# Patient Record
Sex: FEMALE | Race: WHITE | Employment: OTHER | URBAN - METROPOLITAN AREA
[De-identification: names, ages, dates, MRNs, and addresses within clinical notes are randomized per-mention and may not be internally consistent; named-entity substitution may affect disease eponyms.]

---

## 2017-04-06 ENCOUNTER — HOSPITAL ENCOUNTER (OUTPATIENT)
Dept: PHYSICAL THERAPY | Age: 72
Discharge: HOME | End: 2017-04-06
Payer: MEDICARE

## 2017-04-06 PROCEDURE — 97162 PT EVAL MOD COMPLEX 30 MIN: CPT

## 2017-04-06 PROCEDURE — G8978 MOBILITY CURRENT STATUS: HCPCS

## 2017-04-06 PROCEDURE — 97112 NEUROMUSCULAR REEDUCATION: CPT

## 2017-04-06 PROCEDURE — G8979 MOBILITY GOAL STATUS: HCPCS

## 2017-04-06 NOTE — PROGRESS NOTES
[facility] PHYSICAL THERAPY AT [address], [address] [place] [address] Way, 216 [facility], [address] [place] [address] Ln - Phone: [phone]  Fax: [phone] / 17[address] [facility]  Patient Name: Lety Kelsey : 1945   Medical   Diagnosis: Other abnormalities of gait and mobility [R26.89] Treatment Diagnosis: Balance dysfunction   Onset Date: 2017     Referral Source: Susie Fierro MD Start of [facility]): 2017   Prior Hospitalization: See medical history Provider #: [id number]   Prior Level of Function: No reports of balance dysfunction   Comorbidities: Blood pressure issues   Medications: Verified on Patient Summary List     The Plan of Care and following information is based on the information from the initial evaluation.     ==================================================================================  Assessment / key information:   Lety Kelsey is a 73 y.o.  yo female with Dx of Other abnormalities of gait and mobility [R26.89]. She reports onset in 2017 following fracture of her right tibia. Following injury, she was in a boot for 3 weeks. She denies falls, but reports unsteadiness. She denies dizziness. Objectively, the patient demonstrates LE strength WFLs, but demonstrates weakness of hip extension, DF and PF. Sensation in the LEs in intact. Visual inspection of feet reveals L foot hammertoe formation of digit 2 over the great toe. The right great toe has bunion formation and decreased extension mobility. Ms. Regina Live has decreased static/dynamic functional balance, diminished ambulatory balance (FGA = 18/30 points). Pt will benefit from PT/Vestibular rehab to address these deficits, improve functional independence and imbalance with normal daily activities.  Thank you for this referral.   ===========================================================================================  Eval Complexity: History MEDIUM  Complexity : 1-2 comorbidities / personal factors will impact the outcome/ POC ;  Examination  MEDIUM Complexity : 3 Standardized tests and measures addressing body structure, function, activity limitation and / or participation in recreation ; Presentation MEDIUM Complexity : Evolving with changing characteristics ; Decision Making MEDIUM Complexity : FOTO score of 26-74; Overall Complexity MEDIUM  Problem List: impaired gait/ balance, decrease ADL/ functional abilitiies, decrease activity tolerance and decrease transfer abilities   Treatment Plan may include any combination of the following: Therapeutic exercise, Therapeutic activities, Neuromuscular re-education, Gait/balance training and Patient education  Patient / Family readiness to learn indicated by: asking questions, trying to perform skills and interest  Persons(s) to be included in education: patient (P)  Barriers to Learning/Limitations: no  Measures taken: None needed   Patient Goal (s): Good gait, balance and posture   Rehabilitation Potential: excellent  Reported health status: good   Short Term Goals: To be accomplished in 4-6  treatments:  1. Patient will report at least 25% reduction of symptoms with ADLs. 2. Patient will be independent and complaint with HEP TID to reduce imbalance with ADLs.  Long Term Goals: To be accomplished in 10-12 treatments:  1. Patient will report at least 50% reduction of symptoms with ADLs. 2. Patient will be independent with self progression of HEP and demonstrate willingness to continue HEP after D/C to maximize/maintain gains in functional mobility. 3. FGA will improve to greater then or equal to 22/30 points to demonstrate a significant improvement in ambulatory balance. Frequency / Duration:   Patient to be seen  2  times per week for 10  treatments:  Patient / Caregiver education and instruction: self care, activity modification and exercises  G-Codes (GP): Mobility [id number] Current  CJ= 20-39%   Goal  CI= 1-19%. The severity rating is based on the FOTO Score  Therapist Signature: Suresh Palacios MILO Date: 2/5/4374   Certification Period: 4/6/17 to 7/3/17 Time: 11:29 AM   ===========================================================================================  I certify that the above Physical Therapy Services are being furnished while the patient is under my care. I agree with the treatment plan and certify that this therapy is necessary. Physician Signature:        Date:       Time:     Please sign and return to [facility] or you may fax the signed copy to [phone]. Thank you.

## 2017-04-06 NOTE — PROGRESS NOTES
PHYSICAL THERAPY - DAILY TREATMENT NOTE    Patient Name: Melissa Ribeiro        Date: 2017  : 1945    Patient  Verified: yes  Visit #:   1   of   10  Insurance: Payor: Mono Branham / Plan: [place] MEDICARE PART A & B / Product Type: Medicare /      In time: 1010 Out time: 1100   Total Treatment Time: 50     Medicare Time Tracking (below)   Total Timed Codes (min):  15 1:1 Treatment Time:  50     TREATMENT AREA =  Other abnormalities of gait and mobility [R26.89]    SUBJECTIVE  Pain Level (on 0 to 10 scale):  0  / 10   Medication Changes/New allergies or changes in medical history, any new surgeries or procedures?     NO    If yes, update Summary List   Subjective Functional Status/Changes:  []  No changes reported     See EVAL/ POC         OBJECTIVE  Modalities Rationale: to decrease pain, edema, neural compromise in order to perform ADLs/ rest with improved ease        min [] Estim, type/location:                                      []  att     []  unatt     []  w/US     []  w/ice    []  w/heat    min []  Mechanical Traction: type/lbs                   []  pro   []  sup   []  int   []  cont    []  before manual    []  after manual    min []  Ultrasound, settings/location:      min []  Iontophoresis w/ dexamethasone, location:                                               []  take home patch       []  in clinic    min []  Ice     []  Heat    location/position:     min []  Vasopneumatic Device, press/temp:     min []  Other:    [] Skin assessment post-treatment (if applicable):    []  intact    []  redness- no adverse reaction     []redness  adverse reaction:         min Therapeutic Exercise:  []  See flow sheet   Rationale:      increase ROM and increase strength to improve the patients ability to perform ADLs      min Manual Therapy:    Rationale:      decrease pain, increase ROM, increase tissue extensibility and decrease trigger points to improve patient's ability to perform ADLs    15 min Neuromuscular Re-ed: See flow sheet   Rationale:    improve coordination, improve balance, increase proprioception and improved posture, improve motor control to improve the patients ability to perform ADLs     min Gait Training:    Rationale:       min Patient Education:  YES  Reviewed HEP   []  Progressed/Changed HEP based on:         Other Objective/Functional Measures:    See EVAL/ POC     Post Treatment Pain Level (on 0 to 10) scale:   0  / 10     ASSESSMENT      [x]  See POC     PLAN  [x]  Upgrade activities as tolerated  Continue plan of care: yes   []  Discharge due to :    []  Other:      Therapist: Celine Kenny PT    Date: 4/6/2017 Time: 11:26 AM     Future Appointments  Date Time Provider Piyush Blanco   4/13/2017 12:00 PM Celine Kenny PT REHAB CENTER AT [facility]   4/19/2017 12:00 PM Angela Sherman, PT REHAB CENTER AT [facility]   4/20/2017 12:00 PM Celine Kenny PT REHAB CENTER AT [facility]   4/24/2017 10:30 AM Angela Sherman, PT REHAB CENTER AT [facility]   4/27/2017 11:30 AM Celine Kenny PT REHAB CENTER AT [facility]   5/1/2017 11:30 AM Celine Kenny PT REHAB CENTER AT [facility]   5/4/2017 11:00 AM Celine Kenny PT REHAB CENTER AT [facility]

## 2017-04-13 ENCOUNTER — HOSPITAL ENCOUNTER (OUTPATIENT)
Dept: PHYSICAL THERAPY | Age: 72
Discharge: HOME | End: 2017-04-13
Payer: MEDICARE

## 2017-04-13 PROCEDURE — 97110 THERAPEUTIC EXERCISES: CPT

## 2017-04-13 PROCEDURE — 97112 NEUROMUSCULAR REEDUCATION: CPT

## 2017-04-13 NOTE — PROGRESS NOTES
PHYSICAL THERAPY - DAILY TREATMENT NOTE    Patient Name: Jolly Bender        Date: 2017  : 1945   YES Patient  Verified  Visit #:   2   of   10  Insurance: Payor: Jonny Hirsch / Plan: [place] MEDICARE PART A & B / Product Type: Medicare /      In time: 3695 Out time: 1573   Total Treatment Time: 35     Medicare Time Tracking (below)   Total Timed Codes (min):  35 1:1 Treatment Time:  35     TREATMENT AREA = Other abnormalities of gait and mobility [R26.89]    SUBJECTIVE  Pain Level (on 0 to 10 scale):  0  / 10   Medication Changes/New allergies or changes in medical history, any new surgeries or procedures? NO    If yes, update Summary List   Subjective Functional Status/Changes:  []  No changes reported     Working on ex's        OBJECTIVE    10 min Therapeutic Exercise:  [x]  See flow sheet   Rationale:      increase strength to improve the patients ability to amb/ transfer with improved safety         25 min Neuromuscular Re-ed: [x]  See flow sheet   Rationale:    improve coordination, improve balance and increase proprioception to improve the patients ability to ambulate/ transfer with improved safety        throughout Rx min Patient Education:  YES  Reviewed HEP   []  Progressed/Changed HEP based on: Other Objective/Functional Measures:    Initiated dynamic gait activity. Pt requires close supervision    Provided pt with written instruct for HEP     Post Treatment Pain Level (on 0 to 10) scale:   0  / 10     ASSESSMENT  Assessment/Changes in Function:     Functional improvement:  Progressed HEP   Functional limitation:  Balance difficulty with amb      []  See Progress Note/Recertification   Patient will continue to benefit from skilled PT services to modify and progress therapeutic interventions, address functional mobility deficits, address strength deficits and address imbalance/dizziness to attain remaining goals. Progress toward goals / Updated goals:    Progressed HEP.  Provided written instruct     PLAN  []  Upgrade activities as tolerated YES Continue plan of care   []  Discharge due to :    []  Other:      Therapist: Suresh Palacios PT    Date: 4/13/2017 Time: 1:08 PM     Future Appointments  Date Time Provider Piyush Blanco   4/19/2017 12:00 PM Marlen Kumar PT REHAB CENTER AT [facility]   4/20/2017 12:00 PM Suresh Palacios PT REHAB CENTER AT [facility]   4/24/2017 10:30 AM Marlen Kumar PT REHAB CENTER AT [facility]   4/27/2017 11:30 AM Suresh Palacios PT REHAB CENTER AT [facility]   5/1/2017 11:30 AM Suresh Palacios PT REHAB CENTER AT [facility]

## 2017-04-20 ENCOUNTER — HOSPITAL ENCOUNTER (OUTPATIENT)
Dept: PHYSICAL THERAPY | Age: 72
Discharge: HOME | End: 2017-04-20
Payer: MEDICARE

## 2017-04-20 PROCEDURE — 97110 THERAPEUTIC EXERCISES: CPT

## 2017-04-20 PROCEDURE — 97112 NEUROMUSCULAR REEDUCATION: CPT

## 2017-04-20 NOTE — PROGRESS NOTES
PHYSICAL THERAPY - DAILY TREATMENT NOTE    Patient Name: Melissa Ribeiro        Date: 2017  : 1945   YES Patient  Verified  Visit #:   3   of   10  Insurance: Payor: Mono Branham / Plan: [place] MEDICARE PART A & B / Product Type: Medicare /      In time: 6285 Out time: 7618   Total Treatment Time: 40     Medicare Time Tracking (below)   Total Timed Codes (min):  40 1:1 Treatment Time:  40     TREATMENT AREA = Other abnormalities of gait and mobility [R26.89]    SUBJECTIVE  Pain Level (on 0 to 10 scale):  0  / 10   Medication Changes/New allergies or changes in medical history, any new surgeries or procedures? NO    If yes, update Summary List   Subjective Functional Status/Changes:  []  No changes reported     Practicing ex at home. No falls or near falls        OBJECTIVE    10 min Therapeutic Exercise:  [x]  See flow sheet   Rationale:      increase strength to improve the patients ability to perform ADLs/ transfer/ amb with inc ease         30 min Neuromuscular Re-ed: [x]  See flow sheet   Rationale:    increase strength, improve coordination, improve balance and increase proprioception to improve the patients ability to amb safely       min Gait Training:    Rationale:        throughout Rx min Patient Education:  YES  Reviewed HEP   []  Progressed/Changed HEP based on: Other Objective/Functional Measures:    Able to progress footing with balance activity- EO/ compliant surface     Post Treatment Pain Level (on 0 to 10) scale:   0  / 10     ASSESSMENT  Assessment/Changes in Function:     Functional improvement:  No falls or near falls   Functional limitation:  Balance dysfunction      []  See Progress Note/Recertification   Patient will continue to benefit from skilled PT services to modify and progress therapeutic interventions, address functional mobility deficits, address strength deficits and address imbalance/dizziness to attain remaining goals.    Progress toward goals / Updated goals:    Progressed HEP     PLAN  [x]  Upgrade activities as tolerated YES Continue plan of care   []  Discharge due to :    []  Other:      Therapist: Valeriy Sorto PT    Date: 4/20/2017 Time: 10:54 AM     Future Appointments  Date Time Provider Piyush Blanco   4/20/2017 11:00 AM Valeriy Sorto PT REHAB CENTER AT [facility]   4/24/2017 10:30 AM Angela Sherman PT REHAB CENTER AT [facility]   4/27/2017 11:30 AM Valeriy Sorto, PT REHAB CENTER AT [facility]   5/1/2017 11:30 AM Valeriy Sorto, PT REHAB CENTER AT [facility]

## 2017-04-24 ENCOUNTER — APPOINTMENT (OUTPATIENT)
Dept: PHYSICAL THERAPY | Age: 72
End: 2017-04-24
Payer: MEDICARE

## 2017-04-27 ENCOUNTER — HOSPITAL ENCOUNTER (OUTPATIENT)
Dept: PHYSICAL THERAPY | Age: 72
Discharge: HOME | End: 2017-04-27
Payer: MEDICARE

## 2017-04-27 PROCEDURE — 97110 THERAPEUTIC EXERCISES: CPT

## 2017-04-27 PROCEDURE — 97112 NEUROMUSCULAR REEDUCATION: CPT

## 2017-04-27 NOTE — PROGRESS NOTES
PHYSICAL THERAPY - DAILY TREATMENT NOTE    Patient Name: Jolly Bender        Date: 2017  : 1945   YES Patient  Verified  Visit #:      10  Insurance: Payor: Jonny Hirsch / Plan: [place] MEDICARE PART A & B / Product Type: Medicare /      In time: 1120 Out time: 1200   Total Treatment Time: 40     Medicare Time Tracking (below)   Total Timed Codes (min):  40 1:1 Treatment Time:  25     TREATMENT AREA = Other abnormalities of gait and mobility [R26.89]    SUBJECTIVE  Pain Level (on 0 to 10 scale):  0  / 10   Medication Changes/New allergies or changes in medical history, any new surgeries or procedures? NO    If yes, update Summary List   Subjective Functional Status/Changes:  []  No changes reported     Doing well. No falls or near falls. Doing ex at home.  notices my balance is better   OBJECTIVE    10 min Therapeutic Exercise:  [x]  See flow sheet   Rationale:      increase strength to improve the patients ability to perform ADLs/ amb/ transfer safely         30, 15 1:1 min Neuromuscular Re-ed: [x]  See flow sheet   Rationale:    increase strength, improve coordination, improve balance and increase proprioception to improve the patients ability to perform ADLs/ amb/ transfer safely          throughout Rx min Patient Education:  YES  Reviewed HEP   []  Progressed/Changed HEP based on: Other Objective/Functional Measures:    Able to progress reps of strength ex     Post Treatment Pain Level (on 0 to 10) scale:   0  / 10     ASSESSMENT  Assessment/Changes in Function:     Functional improvement:  Inc ease with climbing stairs.      Functional limitation:  Balance deficits with amb      []  See Progress Note/Recertification   Patient will continue to benefit from skilled PT services to modify and progress therapeutic interventions, address functional mobility deficits, address strength deficits, analyze and cue movement patterns and address imbalance/dizziness to attain remaining goals.   Progress toward goals / Updated goals:    Steady progress.   Reports compliance with HEP     PLAN  []  Upgrade activities as tolerated YES Continue plan of care   []  Discharge due to :    []  Other:      Therapist: Rivera Self PT    Date: 4/27/2017 Time: 11:37 AM     Future Appointments  Date Time Provider Piyush Blanco   5/1/2017 11:30 AM Rivera Self PT REHAB CENTER AT [facility]

## 2017-05-01 ENCOUNTER — HOSPITAL ENCOUNTER (OUTPATIENT)
Dept: PHYSICAL THERAPY | Age: 72
Discharge: HOME | End: 2017-05-01
Payer: MEDICARE

## 2017-05-01 PROCEDURE — 97112 NEUROMUSCULAR REEDUCATION: CPT

## 2017-05-01 PROCEDURE — 97110 THERAPEUTIC EXERCISES: CPT

## 2017-05-01 PROCEDURE — G8980 MOBILITY D/C STATUS: HCPCS

## 2017-05-01 PROCEDURE — G8979 MOBILITY GOAL STATUS: HCPCS

## 2017-05-01 NOTE — PROGRESS NOTES
PHYSICAL THERAPY - DAILY TREATMENT NOTE    Patient Name: Olivia Estrada        Date: 2017  : 1945   YES Patient  Verified  Visit #:   5   of   10  Insurance: Payor: Shaina Aceves / Plan: [place] MEDICARE PART A & B / Product Type: Medicare /      In time: 1120 Out time: 1200   Total Treatment Time: 40     Medicare Time Tracking (below)   Total Timed Codes (min):  30 1:1 Treatment Time:  30     TREATMENT AREA = Other abnormalities of gait and mobility [R26.89]    SUBJECTIVE  Pain Level (on 0 to 10 scale):  0  / 10   Medication Changes/New allergies or changes in medical history, any new surgeries or procedures? NO    If yes, update Summary List   Subjective Functional Status/Changes:  []  No changes reported     Overall- 90% better. I feel a lot better. OBJECTIVE    10 min Therapeutic Exercise:  [x]  See flow sheet   Rationale:      increase strength to improve the patients ability to amb safely/ transition with ease         20 min Neuromuscular Re-ed: [x]  See flow sheet/ FGA completed   Rationale:    improve coordination and improve balance to improve the patients ability to amb safely         throughout Rx min Patient Education:  YES  Reviewed HEP   []  Progressed/Changed HEP based on: Other Objective/Functional Measures:    FOTO= 67     Post Treatment Pain Level (on 0 to 10) scale:   0  / 10     ASSESSMENT  Assessment/Changes in Function:     Functional improvement:  amb with improved safety              Progress toward goals / Updated goals:    Meeting progressing towards goals    G codes: Mobility:   Goal  CI= 1-19%  D/C  CJ= 20-39%. The severity rating is based on the FOTO Score           PLAN  [x]  Upgrade activities as tolerated YES Continue plan of care   []  Discharge due to :    []  Other:      Therapist: Elieser Avila PT    Date: 2017 Time: 11:17 AM     Future Appointments  Date Time Provider Piyush Blanco   2017 11:30 AM Elieser Avila PT REHAB CENTER AT [facility]

## 2017-05-04 ENCOUNTER — APPOINTMENT (OUTPATIENT)
Dept: PHYSICAL THERAPY | Age: 72
End: 2017-05-04
Payer: MEDICARE

## 2017-07-31 NOTE — PROGRESS NOTES
[address] PHYSICAL THERAPY AT [address], [address] [place] [address], [address], [address] [place] [address]  Phone: [phone]  Fax: [phone] FOR PHYSICAL THERAPY          Patient Name: Ben Chan : 1945   Treatment/Medical Diagnosis: Other abnormalities of gait and mobility [R26.89]   Onset Date: 2017    Referral Source: Jacklyn Doll MD Start of [facility]): 17   Prior Hospitalization: See Medical History Provider #: [id number]   Prior Level of Function: No reports of balance dysfunction   Comorbidities: blood pressure issues   Medications: Verified on Patient Summary List   Visits from [facility]: 5 Missed Visits: 1     Goal/Measure of Progress Goal Met? 1. Patient will report at least 50% reduction of symptoms with ADLs. Status at last Eval: na Current Status: 90% yes   2. Patient will be independent with self progression of HEP and demonstrate willingness to continue HEP after D/C to maximize/maintain gains in functional mobility. Status at last Eval: na Current Status: independent yes   3. FGA will improve to greater then or equal to 22/30 points to demonstrate a significant improvement in ambulatory balance. Status at last Eval:  Current Status: 2330 yes       Key Functional Changes/Progress: Patient demonstrates/ reports improved balance with standing and ambulating. She is independent with HEP. G-Codes (GP): Mobility:  [id number] Goal  CI= 1-19%  D/C  CJ= 20-39%. The severity rating is based on the FOTO Score  Assessments/Recommendations: Discontinue therapy. Progressing towards or have reached established goals. If you have any questions/comments please contact us directly at [phone]. Thank you for allowing us to assist in the care of your patient.     Therapist Signature: Darrin Morrow, MILO Date: 17   Reporting Period: 17 to 17 Time: 9:30 AM

## 2018-05-02 ENCOUNTER — HOSPITAL ENCOUNTER (OUTPATIENT)
Dept: PHYSICAL THERAPY | Age: 73
Discharge: HOME | End: 2018-05-02
Payer: MEDICARE

## 2018-05-02 PROCEDURE — 97112 NEUROMUSCULAR REEDUCATION: CPT

## 2018-05-02 PROCEDURE — G8979 MOBILITY GOAL STATUS: HCPCS

## 2018-05-02 PROCEDURE — G8978 MOBILITY CURRENT STATUS: HCPCS

## 2018-05-02 PROCEDURE — 97162 PT EVAL MOD COMPLEX 30 MIN: CPT

## 2018-05-02 NOTE — PROGRESS NOTES
[address]   PHYSICAL THERAPY AT [address] [facility] [address] [facility], [address] [place] [address], 216 [facility], [address] [place] [address] - Phone: [phone]  Fax: [phone] / [facility]  Patient Name: Kimber Macias : 1945   Medical   Diagnosis: Other abnormalities of gait and mobility [R26.89] Treatment Diagnosis: Balance dysfunction/ gait dysfunction   Onset Date: ongoing     Referral Source: Heather Naranjo MD Start of Care [facility]): 2018   Prior Hospitalization: See medical history Provider #: [id number]   Prior Level of Function: Ongoing balance and gaitdysfunction   Comorbidities: HTN   Medications: Verified on Patient Summary List     The Plan of Care and following information is based on the information from the initial evaluation.     ==================================================================================  Assessment / key information:   Kimber Macias is a 81 y.o.  yo female with Dx of Other abnormalities of gait and mobility [R26.89]. She reports that her family has noticed progressive worsening of her posture which results in balance and gait issues. Objectively, the patient stands with forward trunk posture with L lateral tilt. She demonstrates decreased static/dynamic functional balance, diminished ambulatory balance (FGA = 18/30 points). LE strength and ROM are WNLs. She reports normal sensation to light touch. Pt will benefit from PT/Vestibular rehab to address these deficits, improve functional independence and imbalance with normal daily activities.  Thank you for this referral.   ===========================================================================================  Eval Complexity: History MEDIUM  Complexity : 1-2 comorbidities / personal factors will impact the outcome/ POC ;  Examination  MEDIUM Complexity : 3 Standardized tests and measures addressing body structure, function, activity limitation and / or participation in recreation ; Presentation MEDIUM Complexity : Evolving with changing characteristics ; Decision Making Other outcome measures Functional Gait Assessment  MEDIUM; Overall Complexity MEDIUM  Problem List: impaired gait/ balance, decrease ADL/ functional abilitiies, decrease activity tolerance and decrease transfer abilities   Treatment Plan may include any combination of the following: Therapeutic exercise, Therapeutic activities, Neuromuscular re-education, Gait/balance training and Patient education  Patient / Family readiness to learn indicated by: asking questions, trying to perform skills and interest  Persons(s) to be included in education: patient (P)  Barriers to Learning/Limitations: no  Measures taken: None needed   Patient Goal (s): Restore posture   Rehabilitation Potential: excellent  Self reported health status: fair- good   Short Term Goals: To be accomplished in 4-5  treatments:  1. Patient will report at least 25% reduction of symptoms with ADLs. 2. Patient will be independent and complaint with HEP TID to reduce imbalance and dizziness with ADLs. 3. FGA will improve to greater than or equal to 20points to demonstrate reduction of imbalance with ADLs.  Long Term Goals: To be accomplished in 10 treatments:  1. Patient will report at least 50% reduction of symptoms with ADLs. 2. Patient will be independent with self progression of HEP and demonstrate willingness to continue HEP after D/C to maximize/maintain gains in functional mobility. 3. FGA will improve to greater then or equal to 22/30 points to demonstrate a significant improvement in ambulatory balance. Frequency / Duration:   Patient to be seen  2  times per week for 10  treatments:  Patient / Caregiver education and instruction: self care, activity modification and exercises  G-Codes (GP): Mobility [id number] Current  CK= 40-59%   Goal  CJ= 20-39%.   The severity rating is based on the Other FGA  Therapist Signature: Mj Godfrey PT Date: 8/5/0059   Certification Period: 5/2/18- 7/31/18 Time: 3:48 PM   ===========================================================================================  I certify that the above Physical Therapy Services are being furnished while the patient is under my care. I agree with the treatment plan and certify that this therapy is necessary. Physician Signature:        Date:       Time:     Please sign and return to [facility] or you may fax the signed copy to [phone]. Thank you.

## 2018-05-02 NOTE — PROGRESS NOTES
PHYSICAL THERAPY - DAILY TREATMENT NOTE    Patient Name: Loraine Alejo        Date: 2018  : 1945    Patient  Verified: yes  Visit #:   1   of   10  Insurance: Payor: Willow Walker / Plan: [place] MEDICARE PART A & B / Product Type: Medicare /      In time: 305 Out time: 345   Total Treatment Time: 40     Medicare Time Tracking (below)   Total Timed Codes (min):  15 1:1 Treatment Time:  40     TREATMENT AREA =  Other abnormalities of gait and mobility [R26.89]    SUBJECTIVE  Pain Level (on 0 to 10 scale):  0  / 10   Medication Changes/New allergies or changes in medical history, any new surgeries or procedures?     NO    If yes, update Summary List   Subjective Functional Status/Changes:  []  No changes reported     See EVAL/ POC         OBJECTIVE  Modalities Rationale: to decrease pain, edema, neural compromise in order to perform ADLs/ rest with improved ease        min [] Estim, type/location:                                      []  att     []  unatt     []  w/US     []  w/ice    []  w/heat    min []  Mechanical Traction: type/lbs                   []  pro   []  sup   []  int   []  cont    []  before manual    []  after manual    min []  Ultrasound, settings/location:      min []  Iontophoresis w/ dexamethasone, location:                                               []  take home patch       []  in clinic    min []  Ice     []  Heat    location/position:     min []  Vasopneumatic Device, press/temp:     min []  Other:    [] Skin assessment post-treatment (if applicable):    []  intact    []  redness- no adverse reaction     []redness  adverse reaction:         min Therapeutic Exercise:  []  See flow sheet   Rationale:      increase ROM and increase strength to improve the patients ability to perform ADLs      min Manual Therapy:    Rationale:      decrease pain, increase ROM, increase tissue extensibility and decrease trigger points to improve patient's ability to perform ADLs    15 min Neuromuscular Re-ed: See flow sheet   Rationale:    improve coordination, improve balance, increase proprioception and improved posture, improve motor control to improve the patients ability to perform ADLs     min Gait Training:    Rationale:       min Patient Education:  YES  Reviewed HEP   []  Progressed/Changed HEP based on: Other Objective/Functional Measures:    See EVAL/ POC     Post Treatment Pain Level (on 0 to 10) scale:   0  / 10     ASSESSMENT      [x]  See POC     PLAN  [x]  Upgrade activities as tolerated  Continue plan of care: yes   []  Discharge due to :    []  Other:      Therapist: Uri Watson PT    Date: 5/2/2018 Time: 3:46 PM     No future appointments.

## 2018-05-09 ENCOUNTER — HOSPITAL ENCOUNTER (OUTPATIENT)
Dept: PHYSICAL THERAPY | Age: 73
Discharge: HOME | End: 2018-05-09
Payer: MEDICARE

## 2018-05-09 PROCEDURE — 97112 NEUROMUSCULAR REEDUCATION: CPT

## 2018-05-09 PROCEDURE — 97110 THERAPEUTIC EXERCISES: CPT

## 2018-05-09 NOTE — PROGRESS NOTES
PHYSICAL THERAPY - DAILY TREATMENT NOTE    Patient Name: Nav Sale        Date: 2018  : 1945   YES Patient  Verified  Visit #:   2   of   10  Insurance: Payor: Bridget Hanks / Plan: [place] MEDICARE PART A & B / Product Type: Medicare /      In time: 4850 Out time: 1055   Total Treatment Time: 30     Medicare Time Tracking (below)   Total Timed Codes (min):  30 1:1 Treatment Time:  30     TREATMENT AREA = Other abnormalities of gait and mobility [R26.89]    SUBJECTIVE  Pain Level (on 0 to 10 scale):  0  / 10   Medication Changes/New allergies or changes in medical history, any new surgeries or procedures? NO    If yes, update Summary List   Subjective Functional Status/Changes:  []  No changes reported     I can feel that I did those exercises        OBJECTIVE    30 min Therapeutic Exercise:  [x]  See flow sheet   Rationale:      increase ROM, increase strength and improve posture to improve the patients ability to perform ADLs         30 min Neuromuscular Re-ed: [x]  See flow sheet   Rationale:    improve coordination and improve balance to improve the patients ability to amb/ stand safely        throughout Rx min Patient Education:  YES  Reviewed HEP   []  Progressed/Changed HEP based on: Other Objective/Functional Measures: Added strength ex to mid back to promote upright posture. Progressed balance ex     Post Treatment Pain Level (on 0 to 10) scale:   0  / 10     ASSESSMENT  Assessment/Changes in Function:     Functional improvement:  Progressed HEP        []  See Progress Note/Recertification   Patient will continue to benefit from skilled PT services to modify and progress therapeutic interventions, address functional mobility deficits, address ROM deficits, address strength deficits and address imbalance/dizziness to attain remaining goals.    Progress toward goals / Updated goals:    Progressed HEP     PLAN  [x]  Upgrade activities as tolerated YES Continue plan of care   [] Discharge due to :    []  Other:      Therapist: Anderson Mclain PT    Date: 5/9/2018 Time: 10:24 AM     Future Appointments  Date Time Provider Piyush Blanco   5/9/2018 10:30 AM Anderson Mclain PT [facility]   5/14/2018 2:00 PM Anderson Mclain PT [facility]   5/24/2018 2:30 PM Mallorie Barboza PT [facility]

## 2018-05-10 ENCOUNTER — APPOINTMENT (OUTPATIENT)
Dept: PHYSICAL THERAPY | Age: 73
End: 2018-05-10
Payer: MEDICARE

## 2018-05-14 ENCOUNTER — HOSPITAL ENCOUNTER (OUTPATIENT)
Dept: PHYSICAL THERAPY | Age: 73
Discharge: HOME | End: 2018-05-14
Payer: MEDICARE

## 2018-05-14 PROCEDURE — 97112 NEUROMUSCULAR REEDUCATION: CPT

## 2018-05-14 PROCEDURE — 97110 THERAPEUTIC EXERCISES: CPT

## 2018-05-14 NOTE — PROGRESS NOTES
PHYSICAL THERAPY - DAILY TREATMENT NOTE    Patient Name: Peggy Winston        Date: 2018  : 1945   YES Patient  Verified  Visit #:   3   of   10  Insurance: Payor: Lela Parrish / Plan: [place] MEDICARE PART A & B / Product Type: Medicare /      In time: 200 Out time: 245   Total Treatment Time: 45     Medicare Time Tracking (below)   Total Timed Codes (min):  45 1:1 Treatment Time:  45     TREATMENT AREA = Other abnormalities of gait and mobility [R26.89]    SUBJECTIVE  Pain Level (on 0 to 10 scale):  0  / 10   Medication Changes/New allergies or changes in medical history, any new surgeries or procedures? NO    If yes, update Summary List   Subjective Functional Status/Changes:  []  No changes reported     Exercise last visit was just enough        OBJECTIVE    25 min Therapeutic Exercise:  [x]  See flow sheet   Rationale:      increase ROM, increase strength and inc stability to improve the patients ability to stand upright         20 min Neuromuscular Re-ed: []  See flow sheet   Rationale:    increase ROM and increase strength, improve balance to improve the patients ability to amb safely        throughout Rx min Patient Education:  YES  Reviewed HEP   []  Progressed/Changed HEP based on: Other Objective/Functional Measures:    Flexed trunk posture with L lean    Requires sign cueing for proper ex technique. Limited range with baby cobra   Post Treatment Pain Level (on 0 to 10) scale:   0  / 10     ASSESSMENT  Assessment/Changes in Function:     Functional improvement:  Reviewed and progressed HEP   Functional limitation: Forward/ Side bent posture      []  See Progress Note/Recertification   Patient will continue to benefit from skilled PT services to modify and progress therapeutic interventions, address functional mobility deficits, address ROM deficits, address strength deficits and assess and modify postural abnormalities to attain remaining goals.    Progress toward goals / Updated goals:    Progressed HEP     PLAN  [x]  Upgrade activities as tolerated YES Continue plan of care   []  Discharge due to :    []  Other:      Therapist: Dari aJrrett PT    Date: 5/14/2018 Time: 1:57 PM     Future Appointments  Date Time Provider Piyush Blanco   5/14/2018 2:00 PM Dari Jarrett, PT [facility]   5/24/2018 2:30 PM Raymundo Clarke, PT [facility]   5/31/2018 3:00 PM Dari End, PT [facility]   6/4/2018 2:00 PM Eulas End, PT [facility]   6/6/2018 1:00 PM Dari Jarrett, PT [facility]   6/13/2018 3:30 PM Dari End, PT [facility]   6/18/2018 2:00 PM Dari Jarrett, PT [facility]   6/27/2018 3:00 PM Raymundo Clarke, PT [facility]

## 2018-05-24 ENCOUNTER — HOSPITAL ENCOUNTER (OUTPATIENT)
Dept: PHYSICAL THERAPY | Age: 73
Discharge: HOME | End: 2018-05-24
Payer: MEDICARE

## 2018-05-24 PROCEDURE — 97112 NEUROMUSCULAR REEDUCATION: CPT

## 2018-05-24 NOTE — PROGRESS NOTES
PHYSICAL THERAPY - DAILY TREATMENT NOTE      Patient Name: Delia Chong        Date: 2018  : 1945   YES Patient  Verified  Visit #:   4   of   10  Insurance: Payor: Luisana Michael / Plan: [place] MEDICARE PART A & B / Product Type: Medicare /      In time: 2:20 Out time: 2:52   Total Treatment Time: 10     Medicare Time Tracking (below)   Total Timed Codes (min):  10 1:1 Treatment Time:  10     TREATMENT AREA = Other abnormalities of gait and mobility [R26.89]    SUBJECTIVE    Pain Level (on 0 to 10 scale):  0  / 10   Medication Changes/New allergies or changes in medical history, any new surgeries or procedures? NO    If yes, update Summary List   Subjective Functional Status/Changes:  []  No changes reported     No significant changes in function       OBJECTIVE    10 min Neuromuscular Re-ed:    Rationale:      improve coordination, improve balance and increase proprioception to improve the patients ability to decrease falls risk     Throughout Tx min Patient Education:  YES  Reviewed HEP   []  Progressed/Changed HEP based on: Other Objective/Functional Measures:    Tandem stance R and L Eo for 30 seconds without LOB     Post Treatment Pain Level (on 0 to 10) scale:   0  / 10     ASSESSMENT    Assessment/Changes in Function:     2 HHA required during MSR on foam with EC to prevent LOB     []  See Progress Note/Recertification   Patient will continue to benefit from skilled PT services to modify and progress therapeutic interventions, address functional mobility deficits, address ROM deficits, address strength deficits, analyze and address soft tissue restrictions, analyze and cue movement patterns, analyze and modify body mechanics/ergonomics, assess and modify postural abnormalities and address imbalance/dizziness to attain remaining goals.    Progress toward goals / Updated goals:    Continue balance training to decrease falls risk     PLAN    [x]  Upgrade activities as tolerated YES Continue plan of care   []  Discharge due to :    []  Other:      Therapist: Kelsy Diaz, PT    Date: 5/24/2018 Time: 2:38 PM   Future Appointments  Date Time Provider Piyush Blanco   5/31/2018 3:00 PM Kevin Sasha, PT [facility]   6/4/2018 2:00 PM Kevin Sasha, PT [facility]   6/6/2018 1:00 PM Kevin Sasha, PT [facility]   6/13/2018 3:30 PM Kevin Sasha, PT [facility]   6/18/2018 2:00 PM Kevin Sasha, PT [facility]   6/27/2018 3:00 PM Kelsy Diaz PT [facility]

## 2018-05-31 ENCOUNTER — APPOINTMENT (OUTPATIENT)
Dept: PHYSICAL THERAPY | Age: 73
End: 2018-05-31
Payer: MEDICARE

## 2018-06-04 ENCOUNTER — APPOINTMENT (OUTPATIENT)
Dept: PHYSICAL THERAPY | Age: 73
End: 2018-06-04

## 2018-06-06 ENCOUNTER — APPOINTMENT (OUTPATIENT)
Dept: PHYSICAL THERAPY | Age: 73
End: 2018-06-06

## 2018-06-13 ENCOUNTER — APPOINTMENT (OUTPATIENT)
Dept: PHYSICAL THERAPY | Age: 73
End: 2018-06-13

## 2018-06-18 ENCOUNTER — APPOINTMENT (OUTPATIENT)
Dept: PHYSICAL THERAPY | Age: 73
End: 2018-06-18

## 2018-06-27 ENCOUNTER — APPOINTMENT (OUTPATIENT)
Dept: PHYSICAL THERAPY | Age: 73
End: 2018-06-27

## 2018-06-28 NOTE — PROGRESS NOTES
[address] PHYSICAL THERAPY AT [address], [address] [place] [address], [facility], [address] [place] [address]  Phone: [phone]  Fax: [phone] SUMMARY  Patient Name: Kimber Macias : 1945   Treatment/Medical Diagnosis: Other abnormalities of gait and mobility [R26.89]   Referral Source: Heather Naranjo MD     Date of Initial Visit: 18 Attended Visits: 4 Missed Visits: 0     SUMMARY OF TREATMENT  Kimber Macias has been seen at our clinic 2x/wk for a total of 4 visits. Pt treatment has consisted of therapeutic exercise for gross UE and LE mm strengthening, postural correction, and balance/gait training. CURRENT STATUS  Unknown secondary to Pt non-compliance with PT attendance  GOAL STATUS  Unable to formally reassess progress toward goals secondary to pt non-compliance with continued PT attendance>1 month (last visit 18). G-Codes: Mobility  [id number] Goal  CJ= 20-39%  D/C  CK= 40-59%. The severity rating is based on the Other FGA     RECOMMENDATIONS  Discontinue therapy due to lack of attendance or compliance. If you have any questions/comments please contact us directly at [phone]. Thank you for allowing us to assist in the care of your patient.     Therapist Signature: Barney Gibson PT Date: 18     Time: 2:31 PM

## 2019-02-25 ENCOUNTER — HOSPITAL ENCOUNTER (OUTPATIENT)
Dept: PHYSICAL THERAPY | Age: 74
Discharge: HOME | End: 2019-02-25
Payer: MEDICARE

## 2019-02-25 PROCEDURE — 97110 THERAPEUTIC EXERCISES: CPT

## 2019-02-25 PROCEDURE — 97162 PT EVAL MOD COMPLEX 30 MIN: CPT

## 2019-02-25 NOTE — PROGRESS NOTES
Vickie Greco [facility] PHYSICAL THERAPY AT [address] [facility], [address] [place] [address], [address] [place] [address], [address] [place] [address] - Phone: [phone]  Fax: [phone] PLAN OF CARE / STATEMENT OF MEDICAL NECESSITY FOR PHYSICAL THERAPY SERVICES Patient Name: Nav Cruz : 1945 Medical  
Diagnosis: Other abnormalities of gait and mobility [R26.89] Treatment Diagnosis: same Onset Date: 2018 Referral Source: Concetta Borden MD [facility]): 2019 Prior Hospitalization: See medical history Provider #: [id number] Prior Level of Function: Progressive worsening of posture affecting walking balance Comorbidities: HTN, Hx of gait and balance dysfunction Medications: Verified on Patient Summary List  
 
The Plan of Care and following information is based on the information from the initial evaluation.  
 
================================================================================== Assessment / duckworth information:   Nav Cruz is a 72 y.o.  yo female with Dx of Other abnormalities of gait and mobility [R26.89]. Patient states she has requested course of PT due to progressive worsening of posture that is affecting her gait and balance. She reports that she feels as if she walks with a waddle and feels more stooped over in recent months. She also reports that recently she has begun to take her BP medicine mid day- and this can make her feel dizzy. Objectively, the patient stands with flexed trunk posture, head is forward and increased thoracic kyphosis is present. She ambulates with slowed heriberto, decreased heel/ toe gait is noted. She demonstrates decreased static/dynamic functional balance, diminished ambulatory balance (FGA = 18/30 points)   LE strength at bilat hips grades grossly 4-/5. Ms. Acosta Dempsey will benefit from PT to address these deficits, improve functional independence with normal daily activities.  Thank you for this referral. =========================================================================================== Eval Complexity: History MEDIUM  Complexity : 1-2 comorbidities / personal factors will impact the outcome/ POC ;  Examination  MEDIUM Complexity : 3 Standardized tests and measures addressing body structure, function, activity limitation and / or participation in recreation ; Presentation MEDIUM Complexity : Evolving with changing characteristics ; Decision Making Other outcome measures FGA  MEDIUM; Overall Complexity MEDIUM Problem List: impaired gait/ balance, decrease ADL/ functional abilitiies, decrease activity tolerance and decrease transfer abilities Treatment Plan may include any combination of the following: Therapeutic exercise, Therapeutic activities, Neuromuscular re-education, Gait/balance training and Patient education Patient / Family readiness to learn indicated by: asking questions, trying to perform skills and interestPersons(s) to be included in education: patient (P) Barriers to Learning/Limitations: None Measures taken, if barriers to learning:   
Patient Goal (s): To maintain better posture with walking Self reported health status: good Rehabilitation Potential: good ? Short Term Goals: To be accomplished in 4-6  treatments: 1. Patient will report at least 25% reduction of symptoms with ADLs. 2. Patient will be independent and complaint with HEP TID to reduce imbalance and dizziness with ADLs. 3. FGA will improve to less than or equal to 20/30 points to demonstrate reduction of imbalance with ADLs. ? Long Term Goals: To be accomplished in 10-12 treatments: 1. Patient will report at least 50% reduction of symptoms with ADLs. 2. Patient will be independent with self progression of HEP and demonstrate willingness to continue HEP after D/C to maximize/maintain gains in functional mobility. 3. Increase bilat hip strength >= 4/5 for improved gait/ transition ability. 4. FGA will improve to greater then or equal to 22/30 points to demonstrate a significant improvement in ambulatory balance. Frequency / Duration:   Patient to be seen  2  times per week for 4-6  weeks: 
Patient / Caregiver education and instruction: self care, activity modification and exercises Therapist Signature: Uri Watson PT Date: 2/25/2019 Certification Period: 2/25/19 to 5/22/19 Time: 3:22 PM  
=========================================================================================== I certify that the above Physical Therapy Services are being furnished while the patient is under my care. I agree with the treatment plan and certify that this therapy is necessary. Physician Signature:       Date:      Time:  Please sign and return to [facility] or you may fax the signed copy to [phone]. Thank you.

## 2019-02-25 NOTE — PROGRESS NOTES
PHYSICAL THERAPY - DAILY TREATMENT NOTE Patient Name: Loraine Alejo        Date: 2019 : 1945   YES Patient  Verified Visit #:   1     Insurance: Payor: Willow Walker / Plan: VA MEDICARE PART A & B / Product Type: Medicare / In time: 230 Out time: 320 Total Treatment Time: 50 Medicare/BCBS Time Tracking (below) Total Timed Codes (min):  15 1:1 Treatment Time:  50 TREATMENT AREA =  Other abnormalities of gait and mobility [R26.89] SUBJECTIVE Pain Level (on 0 to 10 scale):  0  / 10 Medication Changes/New allergies or changes in medical history, any new surgeries or procedures? NO    If yes, update Summary List  
Subjective Functional Status/Changes:  []  No changes reported See yamil hZu OBJECTIVE Modalities Rationale:     decrease pain to improve patient's ability to return to PLOF    
 min [] Estim, type/location:   
                                 []  att     []  unatt     []  w/US     []  w/ice    []  w/heat 
 min []  Mechanical Traction: type/lbs   
               []  pro   []  sup   []  int   []  cont    []  before manual    []  after manual  
 min []  Ultrasound, settings/location:    
 min []  Iontophoresis w/ dexamethasone, location:   
                                           []  take home patch       []  in clinic  
 min []  Ice     []  Heat    location/position:   
 min []  Vasopneumatic Device, press/temp:   
 min []  Other:   
[] Skin assessment post-treatment (if applicable):   
[]  intact    []  redness- no adverse reaction    
[]redness  adverse reaction:   
 
15 min Therapeutic Exercise:  [x]  See flow sheet Rationale:      increase ROM and increase strength to improve the patients ability to return to PLOF  
 min Manual Therapy: Technique:     
[] S/DTM []IASTM []PROM [] Passive Stretching  
[]manual TPR []Jt manipulation:Gr I [] II []  III [] IV[] []REIL with manual OP Treatment Area: Rationale:      decrease pain, increase ROM, increase tissue extensibility and decrease trigger points to improve patient's ability to return to PLOF 
 
 min Neuromuscular Re-ed: [x]  See flow sheet Rationale:      improve coordination, improve balance, increase proprioception and dec dizziness to improve the patients ability to return to PLOF  
 min Self Care:   
Rationale:    increase ROM, increase strength and improve coordination to improve the patients ability to return to PLOF Billed With/As: 
 [] TE 
 [] TA 
 [] Neuro 
 [] Self Care Patient Education: [x] Review HEP [] Progressed/Changed HEP based on:  
[] positioning   [] body mechanics   [] transfers   [] heat/ice application   
[] other:   
Other Objective/Functional Measures: 
 
See eval/ POC Post Treatment Pain Level (on 0 to 10) scale:   0  / 10 ASSESSMENT 
 
X  See POC PLAN [x]  Upgrade activities as tolerated {YES) Continue plan of care  
[]  Discharge due to :   
[]  Other:   
 
Therapist: Karla Humphreys PT Date: 2/25/2019 Time: 3:19 PM  
 
Future Appointments Date Time Provider Piyush Blanco 2/27/2019  2:30 PM Tad Elizabeth PT [facility]  
3/4/2019  4:00 PM Tad Elizabeth PT [facility]  
3/6/2019  4:30 PM Tad Elizabeth PT [facility]  
3/11/2019  3:30 PM Tad Elizabeth PT [facility]  
3/13/2019  3:30 PM Tad Elizabeth PT [facility]  
3/18/2019  3:30 PM Tad Elizabeth PT [facility]  
3/20/2019  3:00 PM Tad Elizabeth PT [facility]

## 2019-02-27 ENCOUNTER — HOSPITAL ENCOUNTER (OUTPATIENT)
Dept: PHYSICAL THERAPY | Age: 74
Discharge: HOME | End: 2019-02-27
Payer: MEDICARE

## 2019-02-27 PROCEDURE — 97112 NEUROMUSCULAR REEDUCATION: CPT

## 2019-02-27 PROCEDURE — 97110 THERAPEUTIC EXERCISES: CPT

## 2019-02-27 NOTE — PROGRESS NOTES
PHYSICAL THERAPY - DAILY TREATMENT NOTE Patient Name: Chin Cea        Date: 2019 : 1945   YES Patient  Verified Visit #:   2     Insurance: Payor: Flash Ann / Plan: [place] MEDICARE PART A & B / Product Type: Medicare / In time: 220 Out time: 255 Total Treatment Time: 35 Medicare/BCBS Time Tracking (below) Total Timed Codes (min):  35 1:1 Treatment Time:  35 TREATMENT AREA =  Other abnormalities of gait and mobility [R26.89] SUBJECTIVE Pain Level (on 0 to 10 scale):  0  / 10 Medication Changes/New allergies or changes in medical history, any new surgeries or procedures? NO    If yes, update Summary List  
Subjective Functional Status/Changes:  []  No changes reported Sore after 1st visit OBJECTIVE 25 min Therapeutic Exercise:  [x]  See flow sheet Rationale:      increase ROM and increase strength to improve the patients ability to stand with improved posture 10 min Neuromuscular Re-ed: [x]  See flow sheet Rationale:      improve balance to improve the patients ability to amb/ transition safely Billed With/As: 
 [x] TE 
 [] TA 
 [] Neuro 
 [] Self Care Patient Education: [x] Review HEP [] Progressed/Changed HEP based on:  
[] positioning   [] body mechanics   [] transfers   [] heat/ice application   
[] other:   
 
Other Objective/Functional Measures: 
 
Progressed HEP for strength and balance-see flowsheet Post Treatment Pain Level (on 0 to 10) scale:   0  / 10 ASSESSMENT Assessment/Changes in Function:  
  
Functional improvement:  progressed HEP. Pt reports compliance Functional limitation:  balance/ posture dysfunction 
   
  
[]  See Progress Note/Recertification Patient will continue to benefit from skilled PT services to modify and progress therapeutic interventions, address functional mobility deficits, address ROM deficits, address strength deficits, analyze and address soft tissue restrictions and assess and modify postural abnormalities to attain remaining goals. Progress toward goals / Updated goals: 
  
  
  
PLAN [x]  Upgrade activities as tolerated {YES) Continue plan of care  
[]  Discharge due to :   
[]  Other:   
 
Therapist: Mj Godfrey PT Date: 2/27/2019 Time: 2:16 PM  
 
Future Appointments Date Time Provider Piyush Blanco 2/27/2019  2:30 PM Adelita Care, PT [facility]  
3/4/2019  4:00 PM Adelita Care, PT [facility]  
3/6/2019  4:30 PM Adelita Care, PT [facility] AT [facility]  
3/11/2019  3:30 PM Adelita Care, PT [facility]  
3/13/2019  3:30 PM Adelita Care, PT [facility] AT [facility]  
3/18/2019  3:30 PM Adelita Care, PT [facility]  
3/20/2019  3:00 PM Adelita Care, PT [facility] AT [facility]

## 2019-03-04 ENCOUNTER — HOSPITAL ENCOUNTER (OUTPATIENT)
Dept: PHYSICAL THERAPY | Age: 74
Discharge: HOME | End: 2019-03-04
Payer: MEDICARE

## 2019-03-04 PROCEDURE — 97112 NEUROMUSCULAR REEDUCATION: CPT

## 2019-03-04 PROCEDURE — 97110 THERAPEUTIC EXERCISES: CPT

## 2019-03-04 NOTE — PROGRESS NOTES
PHYSICAL THERAPY - DAILY TREATMENT NOTE Patient Name: yLdia Jameson        Date: 3/4/2019 : 1945   YES Patient  Verified Visit #:   3     Insurance: Payor: Juan Reyes / Plan: [place] MED[place] & B / Product Type: Medicare / In time: 400 Out time: 440 Total Treatment Time: 40 Medicare/BCBS Time Tracking (below) Total Timed Codes (min):  40 1:1 Treatment Time:  40 TREATMENT AREA =  Other abnormalities of gait and mobility [R26.89] SUBJECTIVE Pain Level (on 0 to 10 scale):  0  / 10 Medication Changes/New allergies or changes in medical history, any new surgeries or procedures? NO    If yes, update Summary List  
Subjective Functional Status/Changes:  []  No changes reported No pain. Doing well. Doing ex's. Trying to stand upright OBJECTIVE 20 min Therapeutic Exercise:  [x]  See flow sheet Rationale:      increase ROM, increase strength and improve posture to improve the patients ability to perform ADLs with inc ease 20 min Neuromuscular Re-ed: [x]  See flow sheet Rationale:      improve coordination and improve balance to improve the patients ability to amb safely Billed With/As: 
 [x] TE 
 [] TA [x] Neuro 
 [] Self Care Patient Education: [x] Review HEP [] Progressed/Changed HEP based on:  
[] positioning   [] body mechanics   [] transfers   [] heat/ice application   
[] other:   
Other Objective/Functional Measures: 
 
Verbal/ tactile cues for ex technique Added hip abd and ext with TB Progressed footing with static balance ex Post Treatment Pain Level (on 0 to 10) scale:   0  / 10 ASSESSMENT Assessment/Changes in Function:  
  
Functional improvement:  concentrating on upright posture, improving balance Functional limitation:  balance dysfunction 
   
 
  
Patient will continue to benefit from skilled PT services to modify and progress therapeutic interventions, address functional mobility deficits, address ROM deficits, address strength deficits, analyze and address soft tissue restrictions, analyze and cue movement patterns and address imbalance/dizziness to attain remaining goals. Progress toward goals / Updated goals: 
 
  
[]  See Progress Note/Recertification  
  
PLAN [x]  Upgrade activities as tolerated {YES) Continue plan of care  
[]  Discharge due to :   
[]  Other:   
 
Therapist: Narciso Hogan PT Date: 3/4/2019 Time: 4:02 PM  
 
Future Appointments Date Time Provider Piyush Blanco 3/6/2019  3:30 PM Ligia Riggins PT [facility]  
3/11/2019  3:30 PM Ligia Riggins PT [facility]  
3/13/2019  3:30 PM Ligia Riggins PT [facility]  
3/18/2019  3:30 PM Ligia Riggins PT [facility]  
3/20/2019  3:00 PM Ligia Riggins PT [facility]

## 2019-03-06 ENCOUNTER — HOSPITAL ENCOUNTER (OUTPATIENT)
Dept: PHYSICAL THERAPY | Age: 74
Discharge: HOME | End: 2019-03-06
Payer: MEDICARE

## 2019-03-06 PROCEDURE — 97110 THERAPEUTIC EXERCISES: CPT

## 2019-03-06 PROCEDURE — 97112 NEUROMUSCULAR REEDUCATION: CPT

## 2019-03-11 ENCOUNTER — APPOINTMENT (OUTPATIENT)
Dept: PHYSICAL THERAPY | Age: 74
End: 2019-03-11
Payer: MEDICARE

## 2019-03-13 ENCOUNTER — APPOINTMENT (OUTPATIENT)
Dept: PHYSICAL THERAPY | Age: 74
End: 2019-03-13
Payer: MEDICARE

## 2019-03-20 ENCOUNTER — APPOINTMENT (OUTPATIENT)
Dept: PHYSICAL THERAPY | Age: 74
End: 2019-03-20
Payer: MEDICARE

## 2019-04-22 NOTE — PROGRESS NOTES
Vickie Greco [address] [facility] PHYSICAL THERAPY AT [address] [facility], [address] [place] [address], [address] [place] [address], [address] [place] [address]  Phone: [phone]  Fax: [phone] DISCHARGE SUMMARY Patient Name: Austin Banda : 1945 Treatment/Medical Diagnosis: Other abnormalities of gait and mobility [R26.89] Referral Source: Arias Pendleton MD    
Date of Initial Visit: 19 Attended Visits: 4 Missed Visits: 4 SUMMARY OF TREATMENT 
PT consisted of therapeutic exercise, neuro re-education and patient education of posture, HEP, activity modification CURRENT STATUS Austin Banda has been seen for PT treatment a total of 4 times and was progressing well. She called to cancel subsequent visits due to unrelated medical issue. At this point,she has not returned for treatment as per POC. Formal assessment towards LTGs not completed. Plan: Discharge from Physical Therapy If you have any questions/comments please contact us directly at [phone]. Thank you for allowing us to assist in the care of your patient. Therapist Signature: Dee Dee Verma PT Date: 19   Time: 4:47 PM

## 2019-11-04 ENCOUNTER — APPOINTMENT (OUTPATIENT)
Dept: PHYSICAL THERAPY | Age: 74
End: 2019-11-04

## 2020-01-15 NOTE — PROGRESS NOTES
PHYSICAL THERAPY - DAILY TREATMENT NOTE     Patient Name: Tiarra Jauregui        Date: 3/6/2019  : 1945   YES Patient  Verified  Visit #:   4     Insurance: Payor: Velton Fend / Plan: [place] MEDICARE PART A & B / Product Type: Medicare /      In time: 330 Out time: 410   Total Treatment Time: 40     Medicare/BCBS Time Tracking (below)   Total Timed Codes (min):  40 1:1 Treatment Time:  40     TREATMENT AREA =  Other abnormalities of gait and mobility [R26.89]    SUBJECTIVE    Pain Level (on 0 to 10 scale):  0  / 10   Medication Changes/New allergies or changes in medical history, any new surgeries or procedures?     NO    If yes, update Summary List   Subjective Functional Status/Changes:  []  No changes reported   More aware of posture and working to stand more upright           OBJECTIVE    20 min Therapeutic Exercise:  [x]  See flow sheet   Rationale:      increase ROM and increase strength to improve the patients ability to stand upright/ amb with improved safety     20 min Neuromuscular Re-ed: [x]  See flow sheet   Rationale:      improve coordination and improve balance to improve the patients ability to amb safely         Billed With/As:   [x] TE   [] TA   [] Neuro   [] Self Care Patient Education: [x] Review HEP    [] Progressed/Changed HEP based on:   [] positioning   [] body mechanics   [] transfers   [] heat/ice application    [] other:        Other Objective/Functional Measures:    Patient with improved standing posture noted throughout treatment   Post Treatment Pain Level (on 0 to 10) scale:   0  / 10     ASSESSMENT  Assessment/Changes in Function:      Functional improvement:  posture  Functional limitation:             Patient will continue to benefit from skilled PT services to modify and progress therapeutic interventions, address functional mobility deficits, address ROM deficits, address strength deficits, analyze and address soft tissue restrictions, analyze and cue movement patterns and assess and modify postural abnormalities to attain remaining goals.    Progress toward goals / Updated goals:    Improved standing posture  []  See Progress Note/Recertification      PLAN    [x]  Upgrade activities as tolerated {YES) Continue plan of care   []  Discharge due to :    []  Other:      Therapist: Stephanie Jewell PT    Date: 3/6/2019 Time: 3:40 PM     Future Appointments   Date Time Provider Piyush Blanco   3/11/2019  3:30 PM Davina Ho PT [facility]   3/13/2019  3:30 PM Davina Ho [facility]   3/18/2019  3:30 PM Davina Ho PT [facility]   3/20/2019  3:00 PM Davina Ho, PT [facility] AT [facility] (3) occasionally moist

## 2022-08-29 ENCOUNTER — APPOINTMENT (OUTPATIENT)
Dept: PHYSICAL THERAPY | Age: 77
End: 2022-08-29